# Patient Record
Sex: MALE | Race: OTHER
[De-identification: names, ages, dates, MRNs, and addresses within clinical notes are randomized per-mention and may not be internally consistent; named-entity substitution may affect disease eponyms.]

---

## 2019-06-13 ENCOUNTER — HOSPITAL ENCOUNTER (EMERGENCY)
Dept: HOSPITAL 89 - ER | Age: 39
Discharge: HOME | End: 2019-06-13
Payer: SELF-PAY

## 2019-06-13 VITALS — DIASTOLIC BLOOD PRESSURE: 94 MMHG | SYSTOLIC BLOOD PRESSURE: 144 MMHG

## 2019-06-13 DIAGNOSIS — H10.33: Primary | ICD-10-CM

## 2019-06-13 DIAGNOSIS — J06.9: ICD-10-CM

## 2019-06-13 PROCEDURE — 99282 EMERGENCY DEPT VISIT SF MDM: CPT

## 2019-06-13 NOTE — ER REPORT
History and Physical


Time Seen By MD:  02:05


HPI/ROS


CHIEF COMPLAINT: Eyes burning





HISTORY OF PRESENT ILLNESS: 38-year-old male presents ambulatory to the ER 


complaining of eye burning this evening with mattering and discharge.  Patient 


states he's had cold symptoms for a couple of days.  He's been taking 


over-the-counter medications denies.  His eyes became very burning.  He tried 


over-the-counter drops to soothe them without improvement.  He irrigated his 


eyes once he started to accumulate some mattering.  Patient does not wear 


contacts





REVIEW OF SYSTEMS:


Respiratory: No cough, no dyspnea.


Cardiovascular: No chest pain, no palpitations.


Gastrointestinal: No vomiting, no abdominal pain.


Musculoskeletal: No back pain.


Allergies:  


Coded Allergies:  


     Cephalosporins (Verified  Allergy, Mild, 6/13/19)


     Sulfa (Sulfonamide Antibiotics) (Verified  Allergy, Mild, 6/13/19)


Home Meds


No Active Prescriptions or Reported Meds


Reviewed Nurses Notes:  Yes


Old Medical Records Reviewed:  Yes


Constitutional





Vital Sign - Last 24 Hours








 6/13/19 6/13/19 6/13/19 6/13/19





 02:02 02:03 02:13 02:28


 


Temp  98.8  


 


Pulse  82 83 79


 


Resp  16  


 


B/P (MAP) 144/94 (111) 144/94  


 


Pulse Ox  94 92 92


 


O2 Delivery  Room Air  








Physical Exam


 Vital signs stable, afebrile, pulse ox normal, visual acuity noted.


 General Appearance: The patient is alert, has no immediate need for airway 


protection and no current signs of toxicity.  Moderate distress


Eyes: Pupils equal and round no injection.  Bilateral injection.,  Mattering, 


foreseen instilled.  No uptake on the corneas bilaterally.  There is subtle 


haziness


Respiratory: Chest is non tender, lungs are clear to auscultation.


Cardiac: regular rate and rhythm


Gastrointestinal: Abdomen is soft and non tender, no masses, bowel sounds 


normal.


Musculoskeletal:  Neck: Neck is supple and non tender.


Extremities have full range of motion and are non tender.


Skin: No rashes or lesions.





DIFFERENTIAL DIAGNOSIS: After history and physical exam differential diagnosis 


was considered for conjunctivitis, ultraviolet burns, allergic reaction, aller


gic conjunctivitis,





Medical Decision Making


ED Course/Re-evaluation


ED Course


Patient was admitted to an examination room.  H&P was done.  The differential 


diagnoses was considered.  Patient with bilateral eye burning.  He's had viral 


symptoms for several days.  He appears to have viral conjunctivitis.  Patient 


will be placed on Tobrex drops.  His visual acuity was noted and unremarkable.  


Patient's advised to follow-up with ophthalmology if unimproved in 3 days.


Decision to Disposition Date:  Jun 13, 2019


Decision to Disposition Time:  02:21





Depart


Departure


Latest Vital Signs





Vital Signs








  Date Time  Temp Pulse Resp B/P (MAP) Pulse Ox O2 Delivery O2 Flow Rate FiO2


 


6/13/19 02:28  79   92   


 


6/13/19 02:03 98.8  16 144/94  Room Air  








Impression:  


   Primary Impression:  


   Conjunctivitis


   Additional Impression:  


   Viral upper respiratory infection


Condition:  Improved


Disposition:  HOME OR SELF-CARE


Referrals:  


LUCIO BRUCE MD


New Scripts


No Active Prescriptions or Reported Meds


Patient Instructions:  Conjunctivitis (ED)





Additional Instructions:  


Use Tobrex drops 1 drop 3 times daily for 2-3 days


Follow-up with eye doctor if unimproved in 3-4 days.  Dr. Lucio Bruce





Problem Qualifiers








   Primary Impression:  


   Conjunctivitis


   Conjunctivitis type:  acute  Acute conjunctivitis type:  unspecified  


   Laterality:  bilateral  Qualified Codes:  H10.33 - Unspecified acute 


   conjunctivitis, bilateral








ARTHURYUE               Jun 13, 2019 02:07